# Patient Record
Sex: MALE | Race: BLACK OR AFRICAN AMERICAN | NOT HISPANIC OR LATINO | URBAN - METROPOLITAN AREA
[De-identification: names, ages, dates, MRNs, and addresses within clinical notes are randomized per-mention and may not be internally consistent; named-entity substitution may affect disease eponyms.]

---

## 2024-04-23 ENCOUNTER — EMERGENCY (EMERGENCY)
Facility: HOSPITAL | Age: 64
LOS: 1 days | Discharge: HOME | End: 2024-04-23
Attending: EMERGENCY MEDICINE | Admitting: EMERGENCY MEDICINE
Payer: COMMERCIAL

## 2024-04-23 VITALS
SYSTOLIC BLOOD PRESSURE: 170 MMHG | HEIGHT: 69 IN | OXYGEN SATURATION: 100 % | WEIGHT: 197.98 LBS | DIASTOLIC BLOOD PRESSURE: 97 MMHG | RESPIRATION RATE: 16 BRPM | HEART RATE: 116 BPM

## 2024-04-23 VITALS
HEART RATE: 82 BPM | TEMPERATURE: 98 F | DIASTOLIC BLOOD PRESSURE: 67 MMHG | SYSTOLIC BLOOD PRESSURE: 124 MMHG | RESPIRATION RATE: 18 BRPM | OXYGEN SATURATION: 100 %

## 2024-04-23 DIAGNOSIS — F29 UNSPECIFIED PSYCHOSIS NOT DUE TO A SUBSTANCE OR KNOWN PHYSIOLOGICAL CONDITION: ICD-10-CM

## 2024-04-23 LAB
ALBUMIN SERPL ELPH-MCNC: 4.6 G/DL — SIGNIFICANT CHANGE UP (ref 3.3–5)
ALP SERPL-CCNC: 101 U/L — SIGNIFICANT CHANGE UP (ref 40–120)
ALT FLD-CCNC: 21 U/L — SIGNIFICANT CHANGE UP (ref 10–45)
ANION GAP SERPL CALC-SCNC: 9 MMOL/L — SIGNIFICANT CHANGE UP (ref 5–17)
APAP SERPL-MCNC: <5 UG/ML — LOW (ref 10–30)
AST SERPL-CCNC: 20 U/L — SIGNIFICANT CHANGE UP (ref 10–40)
BASOPHILS # BLD AUTO: 0.06 K/UL — SIGNIFICANT CHANGE UP (ref 0–0.2)
BASOPHILS NFR BLD AUTO: 0.7 % — SIGNIFICANT CHANGE UP (ref 0–2)
BILIRUB SERPL-MCNC: 0.5 MG/DL — SIGNIFICANT CHANGE UP (ref 0.2–1.2)
BUN SERPL-MCNC: 17 MG/DL — SIGNIFICANT CHANGE UP (ref 7–23)
CALCIUM SERPL-MCNC: 10 MG/DL — SIGNIFICANT CHANGE UP (ref 8.4–10.5)
CHLORIDE SERPL-SCNC: 103 MMOL/L — SIGNIFICANT CHANGE UP (ref 96–108)
CO2 SERPL-SCNC: 27 MMOL/L — SIGNIFICANT CHANGE UP (ref 22–31)
CREAT SERPL-MCNC: 1.22 MG/DL — SIGNIFICANT CHANGE UP (ref 0.5–1.3)
EGFR: 66 ML/MIN/1.73M2 — SIGNIFICANT CHANGE UP
EOSINOPHIL # BLD AUTO: 0.01 K/UL — SIGNIFICANT CHANGE UP (ref 0–0.5)
EOSINOPHIL NFR BLD AUTO: 0.1 % — SIGNIFICANT CHANGE UP (ref 0–6)
ETHANOL SERPL-MCNC: <10 MG/DL — SIGNIFICANT CHANGE UP (ref 0–10)
FLUAV AG NPH QL: SIGNIFICANT CHANGE UP
FLUBV AG NPH QL: SIGNIFICANT CHANGE UP
GLUCOSE SERPL-MCNC: 113 MG/DL — HIGH (ref 70–99)
HCT VFR BLD CALC: 44.2 % — SIGNIFICANT CHANGE UP (ref 39–50)
HGB BLD-MCNC: 15.5 G/DL — SIGNIFICANT CHANGE UP (ref 13–17)
IMM GRANULOCYTES NFR BLD AUTO: 0.3 % — SIGNIFICANT CHANGE UP (ref 0–0.9)
LYMPHOCYTES # BLD AUTO: 2.69 K/UL — SIGNIFICANT CHANGE UP (ref 1–3.3)
LYMPHOCYTES # BLD AUTO: 29.3 % — SIGNIFICANT CHANGE UP (ref 13–44)
MCHC RBC-ENTMCNC: 27.6 PG — SIGNIFICANT CHANGE UP (ref 27–34)
MCHC RBC-ENTMCNC: 35.1 GM/DL — SIGNIFICANT CHANGE UP (ref 32–36)
MCV RBC AUTO: 78.6 FL — LOW (ref 80–100)
MONOCYTES # BLD AUTO: 0.6 K/UL — SIGNIFICANT CHANGE UP (ref 0–0.9)
MONOCYTES NFR BLD AUTO: 6.5 % — SIGNIFICANT CHANGE UP (ref 2–14)
NEUTROPHILS # BLD AUTO: 5.79 K/UL — SIGNIFICANT CHANGE UP (ref 1.8–7.4)
NEUTROPHILS NFR BLD AUTO: 63.1 % — SIGNIFICANT CHANGE UP (ref 43–77)
NRBC # BLD: 0 /100 WBCS — SIGNIFICANT CHANGE UP (ref 0–0)
PLATELET # BLD AUTO: 272 K/UL — SIGNIFICANT CHANGE UP (ref 150–400)
POTASSIUM SERPL-MCNC: 4.7 MMOL/L — SIGNIFICANT CHANGE UP (ref 3.5–5.3)
POTASSIUM SERPL-SCNC: 4.7 MMOL/L — SIGNIFICANT CHANGE UP (ref 3.5–5.3)
PROT SERPL-MCNC: 8.5 G/DL — HIGH (ref 6–8.3)
RBC # BLD: 5.62 M/UL — SIGNIFICANT CHANGE UP (ref 4.2–5.8)
RBC # FLD: 14.5 % — SIGNIFICANT CHANGE UP (ref 10.3–14.5)
RSV RNA NPH QL NAA+NON-PROBE: SIGNIFICANT CHANGE UP
SALICYLATES SERPL-MCNC: <0.3 MG/DL — LOW (ref 2.8–20)
SARS-COV-2 RNA SPEC QL NAA+PROBE: SIGNIFICANT CHANGE UP
SODIUM SERPL-SCNC: 139 MMOL/L — SIGNIFICANT CHANGE UP (ref 135–145)
WBC # BLD: 9.18 K/UL — SIGNIFICANT CHANGE UP (ref 3.8–10.5)
WBC # FLD AUTO: 9.18 K/UL — SIGNIFICANT CHANGE UP (ref 3.8–10.5)

## 2024-04-23 PROCEDURE — 93005 ELECTROCARDIOGRAM TRACING: CPT

## 2024-04-23 PROCEDURE — 85025 COMPLETE CBC W/AUTO DIFF WBC: CPT

## 2024-04-23 PROCEDURE — 99285 EMERGENCY DEPT VISIT HI MDM: CPT

## 2024-04-23 PROCEDURE — 80307 DRUG TEST PRSMV CHEM ANLYZR: CPT

## 2024-04-23 PROCEDURE — 80053 COMPREHEN METABOLIC PANEL: CPT

## 2024-04-23 PROCEDURE — 90792 PSYCH DIAG EVAL W/MED SRVCS: CPT

## 2024-04-23 PROCEDURE — 36415 COLL VENOUS BLD VENIPUNCTURE: CPT

## 2024-04-23 PROCEDURE — 93010 ELECTROCARDIOGRAM REPORT: CPT

## 2024-04-23 PROCEDURE — 99284 EMERGENCY DEPT VISIT MOD MDM: CPT | Mod: 25

## 2024-04-23 PROCEDURE — 87637 SARSCOV2&INF A&B&RSV AMP PRB: CPT

## 2024-04-23 RX ORDER — SODIUM CHLORIDE 9 MG/ML
1000 INJECTION INTRAMUSCULAR; INTRAVENOUS; SUBCUTANEOUS ONCE
Refills: 0 | Status: COMPLETED | OUTPATIENT
Start: 2024-04-23 | End: 2024-04-23

## 2024-04-23 RX ORDER — LABETALOL HCL 100 MG
200 TABLET ORAL ONCE
Refills: 0 | Status: COMPLETED | OUTPATIENT
Start: 2024-04-23 | End: 2024-04-23

## 2024-04-23 RX ADMIN — Medication 200 MILLIGRAM(S): at 15:19

## 2024-04-23 RX ADMIN — SODIUM CHLORIDE 2000 MILLILITER(S): 9 INJECTION INTRAMUSCULAR; INTRAVENOUS; SUBCUTANEOUS at 16:00

## 2024-04-23 NOTE — ED BEHAVIORAL HEALTH ASSESSMENT NOTE - DIFFERENTIAL
Unspecified psychosis, suspected chronic psychotic disorder such as schizophrenia, r/o neurocognitive d/o with psychosis, r/o psychosis 2/2 a general medical condition, r/o bipolar spectrum r/o mdd Unspecified psychosis, suspected chronic psychotic disorder such as schizophrenia, r/o neurocognitive d/o with psychosis, r/o psychosis 2/2 a general medical condition, r/o delirium r/o bipolar spectrum r/o mdd

## 2024-04-23 NOTE — ED BEHAVIORAL HEALTH ASSESSMENT NOTE - HPI (INCLUDE ILLNESS QUALITY, SEVERITY, DURATION, TIMING, CONTEXT, MODIFYING FACTORS, ASSOCIATED SIGNS AND SYMPTOMS)
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 Psychiatry consulted for evaluation.     On evaluation, pt presents calm, pleasant, well-related, relaying 15+ year difficulty with a described conspiracy of unknown individuals installing cameras in his home, following him, tracking him through his bank account, and planting a tracking device in his body, possibly through a HTN pill he took years ago. He believes this may be due to "jealousy". He reports that over the weekend, a package was delivered to his home at an usually late time that he believes was part of this conspiracy, and that he has been seeing more people following him and parking cars around his neighborhood in order to monitor him, prompting him to decide to come to the ED for medical evaluation. He describes intermittent auditory and ?visual hallucinations (hearing voices of others while on the phone, seeing people following him), none current. Over years, pt reports making multiple police reports but no cameras or individuals have ever been found. He also reports hiring a . He reports seeking medical evaluation through his PCP multiple times, including head imaging three months ago on referral of his PCP (located on [address]), which has all been negative. He reports referral to psychiatric care by his PCP, but has declined due to belief that the problem is not psychiatric. Pt reports anxiety due to his symptoms and frustration that his family does not "believe him." He denies depressive sx such as sustained low mood, hopelessness, anhedonia, denies ever any suicidality, denies violent behaviors, denies sx suggestive of jorge, denies any substance use. Reports sleeping 6h/night with fair appetite. Denies any other acute trigger for ED evaluation, and is hoping for medical "tests" to clarify if tracking device is present. Unable to provide any collateral phone numbers and declines to remain in ED to find phone number for wife. Psychoeducation provided and treatment options discussed.    No prior records seen in EMR. No record on [facility] Reference #: [id number].  Psyckes – no record

## 2024-04-23 NOTE — ED PROVIDER NOTE - NSFOLLOWUPCLINICS_GEN_ALL_ED_FT
[facility]  Family Medicine  [address]  [place], [place] [address]  Phone: [phone]  Fax:   Follow Up Time: 4-6 Days

## 2024-04-23 NOTE — ED PROVIDER NOTE - NSFOLLOWUPINSTRUCTIONS_ED_ALL_ED_FT
Please follow-up with primary care days.  Return to the emergency department if you develop any concerning symptoms.     Please follow-up with a psychiatrist as advised.

## 2024-04-23 NOTE — ED BEHAVIORAL HEALTH ASSESSMENT NOTE - DESCRIPTION
originally from [facility], moved to [facility] in 1982,  since 1983, has three children in their 30s. Lives with wife and children in [facility] [place] [facility] [place]. 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 Per ED eval "63-year-old male with past medical history of hypertension and HLD [does not recall any of his medications], denies any psychiatric history presents today stating that he wants to get checked out to make sure that there are no tracking devices in his body.  Patient notes that he thinks he has a camera watching him at home and has contacted the police in the past but the police have not done anything as he has not been able to locate this camera.  Patient states that he knows there is a camera in his house because he hears voices.  No SI, HI.  Denies any alcohol or drug use. past hx of tobacco use > 15 years ago. No other complaints.  Denies any chest pain, shortness of breath, headaches or urinary symptoms. No other complaints."

## 2024-04-23 NOTE — ED BEHAVIORAL HEALTH ASSESSMENT NOTE - NS ED BHA DEMOGRAPHICS MEDICAL RECORD REVIEWED CONSENT OBTAINED YN
Head,  normocephalic,  atraumatic,  Face,  Face within normal limits,  Ears,  External ears within normal limits,  Nose/Nasopharynx,  External nose  normal appearance,  nares patent,  no nasal discharge Yes

## 2024-04-23 NOTE — ED BEHAVIORAL HEALTH ASSESSMENT NOTE - REFERRAL / APPOINTMENT DETAILS
Pt encouraged to discuss psychiatric outpatient referral again with his PCP at ?[facility]. Please also provide referral information for OP psychiatric care at: •	[facility] o	Multiple locations in [place] o	Scheduling line: [phone]

## 2024-04-23 NOTE — ED BEHAVIORAL HEALTH ASSESSMENT NOTE - RISK ASSESSMENT
low acute suicide and violence risk low acute suicide and violence risk. No current or past known SI, HI, SA or violence hx.   Additional protective factors include residential stability, relationship stability, sobriety, connection to primary medical care  Static risk factors include age, male gender, h/o psychosis  Modifiable risk factors include lack of connection to OP psychiatric care

## 2024-04-23 NOTE — ED PROVIDER NOTE - OBJECTIVE STATEMENT
63-year-old male with past medical history of hypertension and HLD [does not recall any of his medications], denies any psychiatric history presents today stating that he wants to get checked out to make sure that there are no tracking devices in his body.  Patient notes that he thinks he has a camera watching him at home and has contacted the police in the past but the police have not done anything as he has not been able to locate this camera.  Patient states that he knows there is a camera in his house because he hears voices.  No SI, HI.  Denies any alcohol or drug use. past hx of tobacco use > 15 years ago. No other complaints.  Denies any chest pain, shortness of breath, headaches or urinary symptoms. No other complaints. 63-year-old male with past medical history of hypertension and HLD [does not recall any of his medications], denies any psychiatric history presents today stating that he wants to get checked out to make sure that there are no tracking devices in his body.  Patient notes that he thinks he has a camera watching him at home and has contacted the police in the past, but the police have not done anything as he has not been able to locate this camera.  Patient states that he knows there is a camera in his house because he hears voices. No SI, HI. Denies any alcohol or drug use. Past hx of tobacco use > 15 years ago. No other complaints. Denies any chest pain, shortness of breath, headaches or urinary symptoms. No other complaints.

## 2024-04-23 NOTE — ED ADULT NURSE REASSESSMENT NOTE - NS ED NURSE REASSESS COMMENT FT1
Pt reports dizziness, cold sweats, nausea since taking oral labetolol. VS taken, pt hypotensive. MD Schaefer aware, 1L IVF bolus initiated.

## 2024-04-23 NOTE — ED ADULT TRIAGE NOTE - CHIEF COMPLAINT QUOTE
Pt presents to ED C/O " tracking device placed in me" Pt states, " Last year I was going to the bank and I had to make a report and after I realized I had camera's in my house and I was being spied on, since then I've seen people following me, I told my family but to be honest they don't believe me, I had to take a few pills for blood pressure and I'm worried they had a tracking device in them that went inside of me". Hx HTN, Denies psych hx. Denies SI/HI, denies drug/ alcohol use. Belongings secured with security 1:1 initiated.

## 2024-04-23 NOTE — ED BEHAVIORAL HEALTH ASSESSMENT NOTE - SUMMARY
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      On evaluation, pt presents calm, pleasant, well-related, relaying 15+ years of psychosis without acute exacerbation, including delusions of conspiracy of unknown individuals installing cameras in his home, following him, tracking him through his bank account, and planting a tracking device in his body. He describes intermittent auditory and ?visual hallucinations (versus misinterpretation of external stimuli). Over years, pt reports seeking medical evaluation, including head imaging three months ago, which has all been negative, and reports referral to psychiatric care by his PCP but has declined due to belief that the problem is not psychiatric. Pt reports anxiety due to his symptoms but denies depressive sx, denies ever any suicidality, denies violent behaviors, denies sx suggestive of jorge, denies any substance use. Denies any acute trigger for ED evaluation, and is hoping for medical "tests" to clarify if tracking device is present. Unable to provide any collateral phone numbers and declines to remain in ED to find phone number for wife.    Impression is of a patient with chronic psychosis, likely schizophrenia, presenting with chronic paranoia and delusional beliefs that are distressing but not debilitating, with very poor insight into likely psychiatric etiology of sx. No overt mood sx or cognitive deficits, no disorganized or dangerous behaviors.    Treatment options d/w pt including options of psychiatric admission for diagnostic clarification and medication assessment - pt declines. Psychoeducation provided about psychiatric outpatient care. Currently no evidence of acute dangerousness to self or others or grossly impaired function that would warrant involuntary inpatient-level psychiatric care.    RECOMMENDATIONS  -pt is psychiatrically cleared for discharge  -pt declines psychiatric admission or any psychotropic medication while in ED  -referral information discussed for OP psychiatric care; encourage establishing OP care  -no collateral contacts currently available  -d/w ED attending Dr. Schaefer

## 2024-04-23 NOTE — ED PROVIDER NOTE - PATIENT PORTAL LINK FT
You can access the FollowMyHealth Patient Portal offered by [facility] by registering at the following website: [email or web address]. By joining [employer]’s FollowMyHealth portal, you will also be able to view your health information using other applications (apps) compatible with our system.

## 2024-04-23 NOTE — ED PROVIDER NOTE - CLINICAL SUMMARY MEDICAL DECISION MAKING FREE TEXT BOX
63-year-old male with past medical history of hypertension and HLD [does not recall any of his medications], denies any psychiatric history presents today stating that he wants to get checked out to make sure that there are no tracking devices in his body.  Patient notes that he thinks he has a camera watching him at home and has contacted the police in the past, but the police have not done anything as he has not been able to locate this camera.  Patient states that he knows there is a camera in his house because he hears voices. No SI, HI. Denies any alcohol or drug use. Past hx of tobacco use > 15 years ago. No other complaints. Denies any chest pain, shortness of breath, headaches or urinary symptoms. No other complaints.    ED course: 1:1 observation placed on pt's arrival to ED. VS noted. Patient afebrile and hypertensive (170s/90s).  Labetalol p.o. given for hypertension.  Labs noted and with no acute findings.  Patient medically cleared for psychiatric evaluation.  Psychiatry consulted and in ED to see patient.  Per psychiatry, patient with chronic psychoses and is stable for discharge with outpatient psych follow-up. Psych f/up given to pt by psychiatry. While in ED pt dropped his BPs post labetolol and c/o dizziness (BP 80/50s). No CP/SOB. Given IVF with BPs improving to 130/80s with resolution of sxs. Eating in ED with no complaints. Repeat ECG with NAD. Non-toxic appearing and stable for discharge. To follow up outpatient. Strict return precautions given.

## 2024-04-25 DIAGNOSIS — F29 UNSPECIFIED PSYCHOSIS NOT DUE TO A SUBSTANCE OR KNOWN PHYSIOLOGICAL CONDITION: ICD-10-CM

## 2024-04-25 DIAGNOSIS — I10 ESSENTIAL (PRIMARY) HYPERTENSION: ICD-10-CM

## 2024-04-25 DIAGNOSIS — Z20.822 CONTACT WITH AND (SUSPECTED) EXPOSURE TO COVID-19: ICD-10-CM

## 2024-04-25 DIAGNOSIS — R00.0 TACHYCARDIA, UNSPECIFIED: ICD-10-CM

## 2024-04-25 DIAGNOSIS — E78.5 HYPERLIPIDEMIA, UNSPECIFIED: ICD-10-CM

## 2024-04-25 DIAGNOSIS — Z87.891 PERSONAL HISTORY OF NICOTINE DEPENDENCE: ICD-10-CM

## 2024-11-22 NOTE — ED PROVIDER NOTE - PATIENT IS MEDICALLY STABLE FOR PSYCHIATRIC EVALUATION ADMISSION, OR TRANSFER TO ANOTHER FACILITY
[facility] provides services at a reduced cost to those who are determined to be eligible through [facility]’s financial assistance program. Information regarding [facility]’s financial assistance program can be found by going to [email or web address] or by calling [phone].
Statement Selected